# Patient Record
Sex: MALE | Race: WHITE | NOT HISPANIC OR LATINO | Employment: FULL TIME
[De-identification: names, ages, dates, MRNs, and addresses within clinical notes are randomized per-mention and may not be internally consistent; named-entity substitution may affect disease eponyms.]

---

## 2020-02-24 ENCOUNTER — HEALTH MAINTENANCE LETTER (OUTPATIENT)
Age: 40
End: 2020-02-24

## 2020-12-13 ENCOUNTER — HEALTH MAINTENANCE LETTER (OUTPATIENT)
Age: 40
End: 2020-12-13

## 2021-04-17 ENCOUNTER — HEALTH MAINTENANCE LETTER (OUTPATIENT)
Age: 41
End: 2021-04-17

## 2021-09-26 ENCOUNTER — HEALTH MAINTENANCE LETTER (OUTPATIENT)
Age: 41
End: 2021-09-26

## 2022-05-08 ENCOUNTER — HEALTH MAINTENANCE LETTER (OUTPATIENT)
Age: 42
End: 2022-05-08

## 2023-01-14 ENCOUNTER — HEALTH MAINTENANCE LETTER (OUTPATIENT)
Age: 43
End: 2023-01-14

## 2023-06-02 ENCOUNTER — HEALTH MAINTENANCE LETTER (OUTPATIENT)
Age: 43
End: 2023-06-02

## 2024-05-13 ENCOUNTER — TRANSCRIBE ORDERS (OUTPATIENT)
Dept: OTHER | Age: 44
End: 2024-05-13

## 2024-05-13 DIAGNOSIS — J01.81 OTHER ACUTE RECURRENT SINUSITIS: Primary | ICD-10-CM

## 2024-05-15 ENCOUNTER — TELEPHONE (OUTPATIENT)
Dept: DERMATOLOGY | Facility: CLINIC | Age: 44
End: 2024-05-15
Payer: COMMERCIAL

## 2024-05-15 NOTE — TELEPHONE ENCOUNTER
Left Voicemail (1st Attempt) and Sent Mychart (1st Attempt) for the patient to call back and schedule the following:    Appointment type: new appt  Provider: Joaquin  Return date: next available  Specialty phone number: [phone]  Additional appointment(s) needed: na  Additonal Notes: na

## 2024-09-08 ENCOUNTER — HEALTH MAINTENANCE LETTER (OUTPATIENT)
Age: 44
End: 2024-09-08

## 2024-11-04 ENCOUNTER — OFFICE VISIT (OUTPATIENT)
Dept: ALLERGY | Facility: CLINIC | Age: 44
End: 2024-11-04
Attending: OTOLARYNGOLOGY
Payer: COMMERCIAL

## 2024-11-04 VITALS — WEIGHT: 202 LBS | HEART RATE: 68 BPM | OXYGEN SATURATION: 95 % | HEIGHT: 71 IN | BODY MASS INDEX: 28.28 KG/M2

## 2024-11-04 DIAGNOSIS — J31.0 CHRONIC RHINITIS: Primary | ICD-10-CM

## 2024-11-04 DIAGNOSIS — J01.81 OTHER ACUTE RECURRENT SINUSITIS: ICD-10-CM

## 2024-11-04 PROCEDURE — 99204 OFFICE O/P NEW MOD 45 MIN: CPT | Mod: 25

## 2024-11-04 PROCEDURE — 95004 PERQ TESTS W/ALRGNC XTRCS: CPT

## 2024-11-04 NOTE — LETTER
11/4/2024      Abner Maurice  [address]  [place] [address]      Dear Colleague,    Thank you for referring your patient, Abner Maurice, to the [facility]. Please see a copy of my visit note below.    Abner Maurice was seen in the Allergy Clinic at [facility].    Abner Maurice is a 44 year old male  being seen today for ongoing evaluation of recurrent sinusitis.  Referral from,   [facility] EXTERNAL DATA DEPARTMENT     History of Present Illness:   Patient reports he was allergy tested approximately 20 years ago due to his recurrent sinus infections.  Patient reports he has been dealing with recurrent sinus infections since childhood.  Patient reports overall his recurrent sinus infections did get better when he was isolated during the COVID-19 pandemic.  However in the past year he has been on 4-5 courses of antibiotics in relation to his recurrent sinus infections.  Patient does follow with ENT provider as listed above.  Patient reports he has been currently seen by an immunologist further evaluating his immunological concerns.      Denies perennial/seasonally-exacerbated (Perennial) chronic nasal symptoms (itch, clear rhinorrhea, stuffiness, and sneezing), postnasal drip and ocular symptoms (itching, redness, and watering).  Patient reports he has tried nasal steroid sprays in the past, without significant improvement of his concerns.  [facility]    Question 10/28/2024 12:47 PM CST - Filed by Patient   Reason for visit: Nasal or sinus symptoms   Nasal/ Sinus/ Eye Symptoms    When did your symptoms begin? I get sinusitis several times every year.   What symptoms do you have? Stuffy nose    Sinus pressure   Any prior sinus surgeries? Yes. 2000.   History of nasal polyps? No   History of sinus infections? Yes   How many in the past year? 4   Have you tried pills/oral medications? Yes   Which ones? Antibiotics.  Amoxicillin is no longer effective.   Have you tried nasal sprays? Yes   Which ones? Flonase   Have you used eye drops? No   When do symptoms occur? Year-round   What makes symptoms worse? Dust    Smoke   Environmental and Social History    Place of Residence: House   Do you have Central Air Conditioning? Yes   Type of Heating System: Forced air   Wood burning stove or fireplace: No   Occupation: IT   Pets: No   Do you smoke cigarettes: No   Do you use an e-cigarette or vape? No   Does anyone living in your home smoke or vape? No   Family History    Do your parents, siblings, or children have asthma? No   Do your parents, siblings, or children have environmental allergies? Yes   Who? Son. Likely from his mother s side. Seasonal.   Do your parents, siblings, or children have food allergies? Yes   Who? Daughter. Bananas. Son - chickpeas   Do your parents, siblings, or children have eczema? No       No past medical history on file.    Past Surgical History:   Procedure Laterality Date     HC TOOTH EXTRACTION W/FORCEP  2000     ZZC NONSPECIFIC PROCEDURE  2001    sinus surgery         Current Outpatient Medications:      hydrocortisone (ANUSOL-HC) 2.5 % rectal cream, Place rectally 2 times daily, Disp: 28.35 g, Rfl: 1     NASONEX 50 MCG/ACT NA SUSP, INHALE 2 SPRAYS IN EACH NOSTRIL ONE DAILY, Disp: 1 Inhaler, Rfl: 6     NO ACTIVE MEDICATIONS, ., Disp: , Rfl:   No Known Allergies    Family History   Problem Relation Age of Onset     Diabetes Father      Cardiovascular Maternal Grandfather      Diabetes Paternal Grandfather        EXAM:   There were no vitals taken for this visit.    Physical Exam  Constitutional:       General: He is not in acute distress.     Appearance: Normal appearance. He is not ill-appearing.   HENT:      Nose: No nasal deformity, septal deviation, congestion or rhinorrhea.      Right Turbinates: Not enlarged, swollen or pale.      Left Turbinates: Not enlarged, swollen or pale.      Comments: Slight  nasal turbinate enlargement, right nare slightly larger than left, nonobstructive     Mouth/Throat:      Mouth: Mucous membranes are moist.      Pharynx: Oropharynx is clear. Uvula midline. No posterior oropharyngeal erythema.      Tonsils: 0 on the right. 0 on the left.   Eyes:      General: No allergic shiner.        Right eye: No discharge.         Left eye: No discharge.      Conjunctiva/sclera: Conjunctivae normal.   Cardiovascular:      Rate and Rhythm: Normal rate and regular rhythm.      Heart sounds: Normal heart sounds, S1 normal and S2 normal.   Pulmonary:      Effort: Pulmonary effort is normal. No prolonged expiration.      Breath sounds: Normal breath sounds. No decreased air movement. No wheezing.   Neurological:      Mental Status: He is alert.   Psychiatric:         Mood and Affect: Mood normal.         Behavior: Behavior normal.         Judgment: Judgment normal.         WORKUP: Skin testing, appropriate positive and negative controls     Percutaneous    Environmental Testing     Ordering Provider :  Ariana Lowery PA-C    Testing Technician : [place]    Date: 11/4/2024      Time: 2:13 PM       (W/F in millimeters)    Allergen      Concentration : Manufacture     José Miguel, white  1:20 H   0/0   Birch mix 1:20 H 0/0   [place], common 1:20 H 0/0   4. Elm, American 1:20 H 0/0   5. [place], Shagbark 1:20 H 0/0   6. Maple, Hard/Sugar 1:20 H 0/0   7. [place] Mix 1:20 H 0/0   8. Oak, Red 1:20 H 0/0   9. [place], American 1:20 H 0/0   10. [place] Tree 1:20 H 0/0   11. Dp Mite 30,000 A U /ML H 0/0   12. Df Mite 30,000 A U /ML H 0/0   13. Grass Mix # 4 10,000 B A U /ML H 0/0   14. Lexx grass 1:20 H 0/0   15.Cockroach mix 1:10 H 0/0   16. Alternaria Tenuis 1:10 H 0/0   17. Aspergillus Fumigatus 1:10 H 0/0   18. Homodendrum cladosporioides 1:10 H 0/0   19. Penicillium notatum 1:10 H 0/0   20. Epicoccum 1:10 H 0/0   21. Ragweed, Short 1:20 H 0/0   22. Dock, Sorrel 1:20 H 0/0   23. Lamb's Quarters 1:20 H 0/0   24.  Pigweed, Rough Redroot 1:20 H 0/0   25. Plantain, English 1:20 H 0/0   26. Sagebrush, Mugwort 1:20 H 0/0   27. Cat 10,000  B A U /ML H 0/0   28. AP Dog 1:100 H 0/0   29. Neg. Control: 50% glycerine/saline H 0/0   30. Pos. Control: histamine 6mg/ML 9/30     At today's visit, the patient and I engaged in an informed consent discussion about allergy testing.  We discussed skin testing, blood testing, and the alternative of not undergoing any testing. The patient has a preference for skin testing. We then discussed the risks and benefits of skin testing.  The patient understands skin testing risks can include, but are not limited to, urticaria, angioedema, shortness of breath, and severe anaphylaxis.  The benefits include, but are not limited, to evaluation for allergens causing symptoms.  After answering the patient's questions they have agreed to proceed with skin testing.       ASSESSMENT/PLAN:  Abner Maurice is a 44 year old male with recurrent sinus infections, negative environmental allergy testing.    Recurrent sinus infection  Patient has negative allergy testing today.  Patient has been seen immunologist regarding his recurrent sinus infections, patient did have a copy of his IgM, IgE, IgA laboratory evaluation, without concern.  We discussed that repeat evaluation of his immunoglobulins is not necessary at this time.  Patient reports he does have a another scheduled appointment with immunologist to further go over a treatment plan regarding his condition.  In addition patient reports he does have a return ENT appointment, for further evaluation of his recurrent sinus infection    Patient reports he has intermittent nasal congestion, we did discuss treatment with as needed azelastine nasal spray, 1-2 times per day, 1 to 2 sprays as needed.  Patient reports he occasionally uses nasal strips to aid in his nocturnal nasal congestion.    Follow-up as needed.    Thank you for allowing me to participate in the care  of Abner Maurice.    I spent 50 minutes on the date of the encounter doing chart review, history and exam, documentation and further coordination as noted above exclusive of separately reported interpretations.     This excludes time spent for skin testing interpretation.     Please note that this note consists of symbols derived from keyboarding, dictation and/or voice recognition software. As a result, there may be errors in the script that have gone undetected. Please consider this when interpreting information found in this chart.     Ariana Lowery PA-C  [facility]      Again, thank you for allowing me to participate in the care of your patient.        Sincerely,        Ariana Lowery PA-C

## 2024-11-04 NOTE — PROGRESS NOTES
Abner Maurice was seen in the Allergy Clinic at [facility].    Abner Maurice is a 44 year old male  being seen today for ongoing evaluation of recurrent sinusitis.  Referral from,   [facility] EXTERNAL DATA DEPARTMENT     History of Present Illness:   Patient reports he was allergy tested approximately 20 years ago due to his recurrent sinus infections.  Patient reports he has been dealing with recurrent sinus infections since childhood.  Patient reports overall his recurrent sinus infections did get better when he was isolated during the COVID-19 pandemic.  However in the past year he has been on 4-5 courses of antibiotics in relation to his recurrent sinus infections.  Patient does follow with ENT provider as listed above.  Patient reports he has been currently seen by an immunologist further evaluating his immunological concerns.      Denies perennial/seasonally-exacerbated (Perennial) chronic nasal symptoms (itch, clear rhinorrhea, stuffiness, and sneezing), postnasal drip and ocular symptoms (itching, redness, and watering).  Patient reports he has tried nasal steroid sprays in the past, without significant improvement of his concerns.  [facility]    Question 10/28/2024 12:47 PM CST - Filed by Patient   Reason for visit: Nasal or sinus symptoms   Nasal/ Sinus/ Eye Symptoms    When did your symptoms begin? I get sinusitis several times every year.   What symptoms do you have? Stuffy nose    Sinus pressure   Any prior sinus surgeries? Yes. 2000.   History of nasal polyps? No   History of sinus infections? Yes   How many in the past year? 4   Have you tried pills/oral medications? Yes   Which ones? Antibiotics. Amoxicillin is no longer effective.   Have you tried nasal sprays? Yes   Which ones? Flonase   Have you used eye drops? No   When do symptoms occur? Year-round   What makes symptoms worse? Dust    Smoke   Environmental and Social History     Place of Residence: House   Do you have Central Air Conditioning? Yes   Type of Heating System: Forced air   Wood burning stove or fireplace: No   Occupation: IT   Pets: No   Do you smoke cigarettes: No   Do you use an e-cigarette or vape? No   Does anyone living in your home smoke or vape? No   Family History    Do your parents, siblings, or children have asthma? No   Do your parents, siblings, or children have environmental allergies? Yes   Who? Son. Likely from his mother s side. Seasonal.   Do your parents, siblings, or children have food allergies? Yes   Who? Daughter. Bananas. Son - chickpeas   Do your parents, siblings, or children have eczema? No       No past medical history on file.    Past Surgical History:   Procedure Laterality Date    HC TOOTH EXTRACTION W/FORCEP  2000    ZZC NONSPECIFIC PROCEDURE  2001    sinus surgery         Current Outpatient Medications:     hydrocortisone (ANUSOL-HC) 2.5 % rectal cream, Place rectally 2 times daily, Disp: 28.35 g, Rfl: 1    NASONEX 50 MCG/ACT NA SUSP, INHALE 2 SPRAYS IN EACH NOSTRIL ONE DAILY, Disp: 1 Inhaler, Rfl: 6    NO ACTIVE MEDICATIONS, ., Disp: , Rfl:   No Known Allergies    Family History   Problem Relation Age of Onset    Diabetes Father     Cardiovascular Maternal Grandfather     Diabetes Paternal Grandfather        EXAM:   There were no vitals taken for this visit.    Physical Exam  Constitutional:       General: He is not in acute distress.     Appearance: Normal appearance. He is not ill-appearing.   HENT:      Nose: No nasal deformity, septal deviation, congestion or rhinorrhea.      Right Turbinates: Not enlarged, swollen or pale.      Left Turbinates: Not enlarged, swollen or pale.      Comments: Slight nasal turbinate enlargement, right nare slightly larger than left, nonobstructive     Mouth/Throat:      Mouth: Mucous membranes are moist.      Pharynx: Oropharynx is clear. Uvula midline. No posterior oropharyngeal erythema.      Tonsils: 0 on  the right. 0 on the left.   Eyes:      General: No allergic shiner.        Right eye: No discharge.         Left eye: No discharge.      Conjunctiva/sclera: Conjunctivae normal.   Cardiovascular:      Rate and Rhythm: Normal rate and regular rhythm.      Heart sounds: Normal heart sounds, S1 normal and S2 normal.   Pulmonary:      Effort: Pulmonary effort is normal. No prolonged expiration.      Breath sounds: Normal breath sounds. No decreased air movement. No wheezing.   Neurological:      Mental Status: He is alert.   Psychiatric:         Mood and Affect: Mood normal.         Behavior: Behavior normal.         Judgment: Judgment normal.         WORKUP: Skin testing, appropriate positive and negative controls     Percutaneous    Environmental Testing     Ordering Provider :  Ariana Lowery PA-C    Testing Technician : [place]    Date: 11/4/2024      Time: 2:13 PM       (W/F in millimeters)    Allergen      Concentration : Manufacture     José Miguel, white  1:20 H   0/0   Birch mix 1:20 H 0/0   [place], common 1:20 H 0/0   4. Elm, American 1:20 H 0/0   5. [place], Shagbark 1:20 H 0/0   6. Maple, Hard/Sugar 1:20 H 0/0   7. [place] Mix 1:20 H 0/0   8. Oak, Red 1:20 H 0/0   9. [place], American 1:20 H 0/0   10. [place] Tree 1:20 H 0/0   11. Dp Mite 30,000 A U /ML H 0/0   12. Df Mite 30,000 A U /ML H 0/0   13. Grass Mix # 4 10,000 B A U /ML H 0/0   14. Lexx grass 1:20 H 0/0   15.Cockroach mix 1:10 H 0/0   16. Alternaria Tenuis 1:10 H 0/0   17. Aspergillus Fumigatus 1:10 H 0/0   18. Homodendrum cladosporioides 1:10 H 0/0   19. Penicillium notatum 1:10 H 0/0   20. Epicoccum 1:10 H 0/0   21. Ragweed, Short 1:20 H 0/0   22. Dock, Sorrel 1:20 H 0/0   23. Lamb's Quarters 1:20 H 0/0   24. Pigweed, Rough Redroot 1:20 H 0/0   25. Plantain, English 1:20 H 0/0   26. Sagebrush, Britneywort 1:20 H 0/0   27. Cat 10,000  B A U /ML H 0/0   28. AP Dog 1:100 H 0/0   29. Neg. Control: 50% glycerine/saline H 0/0   30. Pos. Control: histamine  6mg/ML 9/30     At today's visit, the patient and I engaged in an informed consent discussion about allergy testing.  We discussed skin testing, blood testing, and the alternative of not undergoing any testing. The patient has a preference for skin testing. We then discussed the risks and benefits of skin testing.  The patient understands skin testing risks can include, but are not limited to, urticaria, angioedema, shortness of breath, and severe anaphylaxis.  The benefits include, but are not limited, to evaluation for allergens causing symptoms.  After answering the patient's questions they have agreed to proceed with skin testing.       ASSESSMENT/PLAN:  Abner Maurice is a 44 year old male with recurrent sinus infections, negative environmental allergy testing.    Recurrent sinus infection  Patient has negative allergy testing today.  Patient has been seen immunologist regarding his recurrent sinus infections, patient did have a copy of his IgM, IgE, IgA laboratory evaluation, without concern.  We discussed that repeat evaluation of his immunoglobulins is not necessary at this time.  Patient reports he does have a another scheduled appointment with immunologist to further go over a treatment plan regarding his condition.  In addition patient reports he does have a return ENT appointment, for further evaluation of his recurrent sinus infection    Patient reports he has intermittent nasal congestion, we did discuss treatment with as needed azelastine nasal spray, 1-2 times per day, 1 to 2 sprays as needed.  Patient reports he occasionally uses nasal strips to aid in his nocturnal nasal congestion.    Follow-up as needed.    Thank you for allowing me to participate in the care of Abner Maurice.    I spent 50 minutes on the date of the encounter doing chart review, history and exam, documentation and further coordination as noted above exclusive of separately reported interpretations.     This excludes time spent  for skin testing interpretation.     Please note that this note consists of symbols derived from keyboarding, dictation and/or voice recognition software. As a result, there may be errors in the script that have gone undetected. Please consider this when interpreting information found in this chart.     Ariana Lowery PA-C  [facility]

## 2025-08-24 ENCOUNTER — HEALTH MAINTENANCE LETTER (OUTPATIENT)
Age: 45
End: 2025-08-24